# Patient Record
(demographics unavailable — no encounter records)

---

## 2024-12-05 NOTE — HISTORY OF PRESENT ILLNESS
Improving, recently switched to Vegan diet, continue dietary changes with emphasis on calorie counting and portion control. Gradually increase activity to assist with weight loss to help control blood pressure and reduce risk for development of comorbid conditions   [FreeTextEntry1] : CPE [de-identified] :  42yo female with history of nicotine use disorder, elevated WBC and platelets unk etiology who presents for a CPE. She notes chronic intermittent pain of her left lower abdomen where she occasionally feels a mass. She was evaluated by GYN and went for TVUS and pelvic ultrasound without notable findings per patient. No change in bowel movements - goes twice daily and are soft. No prior colonoscopy. Was referred to a surgeon in the past for hernia evaluation but was confused by plan of action presented to her at the time.  She's also had an infection of her right second finger since the summer and recently went to  for care, was rx'd antibiotics but has not resolved.  Both hands shake mostly right over one year.  Walks 5-7 miles per day. Cooks most of her food.   Smokes about 1 pack per day since 13yo. Stopped for all pregnancies. Had tried an oral medication to stop smoking but she doesn't recall which one. She is pre-contemplative.   smokinppd x 20 years, stopped when pregnant  mammo/pap up to date  gyn: andre Buchanan.

## 2024-12-05 NOTE — HEALTH RISK ASSESSMENT
[No] : No [1 or 2 (0 pts)] : 1 or 2 (0 points) [Never (0 pts)] : Never (0 points) [No falls in past year] : Patient reported no falls in the past year [0] : 2) Feeling down, depressed, or hopeless: Not at all (0) [PHQ-2 Negative - No further assessment needed] : PHQ-2 Negative - No further assessment needed [Current] : Current [20 or more] : 20 or more [Employed] : employed [] :  [# Of Children ___] : has [unfilled] children [Fully functional (bathing, dressing, toileting, transferring, walking, feeding)] : Fully functional (bathing, dressing, toileting, transferring, walking, feeding) [Fully functional (using the telephone, shopping, preparing meals, housekeeping, doing laundry, using] : Fully functional and needs no help or supervision to perform IADLs (using the telephone, shopping, preparing meals, housekeeping, doing laundry, using transportation, managing medications and managing finances) [None] : None [Audit-CScore] : 0 [de-identified] : walking  [[id number]] : 0 [Reports changes in hearing] : Reports no changes in hearing [Reports changes in vision] : Reports no changes in vision [Reports changes in dental health] : Reports no changes in dental health [MammogramDate] : 2023 [PapSmearDate] : 2024 [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

## 2024-12-05 NOTE — PHYSICAL EXAM
[No Edema] : there was no peripheral edema [Soft] : abdomen soft [Non-distended] : non-distended [No Masses] : no abdominal mass palpated [No HSM] : no HSM [Normal Bowel Sounds] : normal bowel sounds [Normal Posterior Cervical Nodes] : no posterior cervical lymphadenopathy [Normal Anterior Cervical Nodes] : no anterior cervical lymphadenopathy [Coordination Grossly Intact] : coordination grossly intact [No Focal Deficits] : no focal deficits [Normal] : affect was normal and insight and judgment were intact [de-identified] : ttp of LLQ [de-identified] : right 2nd digit nail bed with thickening, white scaling, minimal surrounding swelling  [de-identified] : no hand tremor noted at rest or with raising

## 2024-12-05 NOTE — REVIEW OF SYSTEMS
[Back Pain] : back pain [Negative] : Respiratory [Nausea] : no nausea [Constipation] : no constipation [Diarrhea] : diarrhea [Vomiting] : no vomiting [Heartburn] : no heartburn [Melena] : no melena [FreeTextEntry7] : LLQ tenderness  [de-identified] : red spot on nose  [de-identified] : slight hand tremor